# Patient Record
Sex: MALE | Race: WHITE | NOT HISPANIC OR LATINO | Employment: FULL TIME | URBAN - METROPOLITAN AREA
[De-identification: names, ages, dates, MRNs, and addresses within clinical notes are randomized per-mention and may not be internally consistent; named-entity substitution may affect disease eponyms.]

---

## 2019-04-02 NOTE — PROGRESS NOTES
"  SUBJECTIVE:   Raymond Amaya is a 44 year old male who presents to clinic today for the following health issues:    New patient     Patient was having wisdom tooth removed at dental office a few weeks ago and was told his BP was elevated 150's/?    Other concerns:  1. Growth- R axillary.  He states he started and noted about a year ago.  Periodically it does cause some mild odor.  2. Sharp intermittent pain in left side of rib over the last 2 weeks.  There are no classic exertional complaints or recent trauma.  3.  Patient also informs me that his father was recently diagnosed apparently with hemochromatosis and that his father has 2 brothers of similar diagnosis.  His father is undergoing regular phlebotomies    Problem list and histories reviewed & adjusted, as indicated.  Additional history: as documented    Patient Active Problem List   Diagnosis     Corn or callus     Past Surgical History:   Procedure Laterality Date     C MUSCLE-SKIN FLAP,ARM       HC RECONST POLYDACT DIGIT,SOFT TIS & BONE      removal 6th digit from each hand and foot       Social History     Tobacco Use     Smoking status: Never Smoker     Smokeless tobacco: Never Used   Substance Use Topics     Alcohol use: Yes     Comment: \"every now and then\"     Family History   Problem Relation Age of Onset     Liver Disease Father         hemochromatosis         No current outpatient medications on file.     No Known Allergies  BP Readings from Last 3 Encounters:   04/04/16 140/70   05/19/15 130/90   09/13/12 122/64    Wt Readings from Last 3 Encounters:   04/04/16 105.2 kg (232 lb)   05/19/15 108 kg (238 lb)   09/13/12 106 kg (233 lb 9.6 oz)                    Reviewed and updated as needed this visit by clinical staff  Meds       Reviewed and updated as needed this visit by Provider       ROS:  CONSTITUTIONAL: NEGATIVE for fever, chills, change in weight  ENT/MOUTH: NEGATIVE for ear, mouth and throat problems  RESP: NEGATIVE for significant cough " "or SOB  CV: NEGATIVE for chest pain, palpitations or peripheral edema  GI: NEGATIVE for nausea, abdominal pain, heartburn, or change in bowel habits  : NEGATIVE for frequency, dysuria, or hematuria  MUSCULOSKELETAL: NEGATIVE for significant arthralgias or myalgia  NEURO: NEGATIVE for weakness, dizziness or paresthesias  HEME: NEGATIVE for bleeding problems  PSYCHIATRIC: NEGATIVE for changes in mood or affect    OBJECTIVE:                                                    /84   Pulse 72   Temp 98  F (36.7  C) (Oral)   Resp 15   Ht 1.81 m (5' 11.25\")   Wt 107.5 kg (236 lb 14.4 oz)   SpO2 98%   BMI 32.81 kg/m    Body mass index is 32.81 kg/m .  GENERAL: alert and no distress  EYES: Eyes grossly normal to inspection, extraocular movements - intact, and PERRL  RESP: lungs clear to auscultation - no rales, no rhonchi, no wheezes  CV: regular rates and rhythm, normal S1 S2, no S3 or S4 and no murmur, no click or rub   MS: extremities- no gross deformities noted, no edema  There is no chest wall pain noted.  There is a superficial, flat slightly variegated elliptical elevated lesion noted roughly 3-1/2 x 6-1/2 cm to the right axilla  NEURO: strength and tone- normal, sensory exam- grossly normal, mentation- intact, speech- normal, reflexes- symmetric  PSYCH: Alert and oriented times 3; speech- coherent , normal rate and volume; able to articulate logical thoughts, able to abstract reason, no tangential thoughts, no hallucinations or delusions, affect- normal       ASSESSMENT/PLAN:                                                      (Z13.6) CARDIOVASCULAR SCREENING; LDL GOAL LESS THAN 130  (primary encounter diagnosis)  Comment: Labs ordered as fasting per routine.  Plan: Lipid panel reflex to direct LDL Fasting            (Z01.30) Blood pressure check  Comment: Blood pressure stable at present point suggest home monitoring and patient given parameters to follow-up  Plan: Hemoglobin, Comprehensive metabolic " panel            (Z83.49) FH: hemochromatosis  Comment: Recheck iron studies and hemoglobin  Plan: Ferritin, Iron and iron binding capacity            (R07.81) Rib pain  Comment: Appears superficial without any other focal changes.  Plan: Discussed options of care, discussed stress testing although low risk and atypical symptoms    (L98.9) Skin lesion  Comment: Advise dermatology appointment and patient will make an appointment  Plan: Question need for dermatological biopsy    See Patient Instructions    Ed Sanchez MD  [facility]

## 2019-04-03 ENCOUNTER — OFFICE VISIT (OUTPATIENT)
Dept: INTERNAL MEDICINE | Facility: CLINIC | Age: 44
End: 2019-04-03
Payer: COMMERCIAL

## 2019-04-03 VITALS
OXYGEN SATURATION: 98 % | DIASTOLIC BLOOD PRESSURE: 84 MMHG | BODY MASS INDEX: 33.17 KG/M2 | SYSTOLIC BLOOD PRESSURE: 134 MMHG | HEIGHT: 71 IN | HEART RATE: 72 BPM | TEMPERATURE: 98 F | WEIGHT: 236.9 LBS | RESPIRATION RATE: 15 BRPM

## 2019-04-03 DIAGNOSIS — Z13.6 CARDIOVASCULAR SCREENING; LDL GOAL LESS THAN 130: Primary | ICD-10-CM

## 2019-04-03 DIAGNOSIS — L98.9 SKIN LESION: ICD-10-CM

## 2019-04-03 DIAGNOSIS — Z01.30 BLOOD PRESSURE CHECK: ICD-10-CM

## 2019-04-03 DIAGNOSIS — Z83.49 FH: HEMOCHROMATOSIS: ICD-10-CM

## 2019-04-03 DIAGNOSIS — R07.81 RIB PAIN: ICD-10-CM

## 2019-04-03 LAB
ALBUMIN SERPL-MCNC: 4.2 G/DL (ref 3.4–5)
ALP SERPL-CCNC: 38 U/L (ref 40–150)
ALT SERPL W P-5'-P-CCNC: 37 U/L (ref 0–70)
ANION GAP SERPL CALCULATED.3IONS-SCNC: 7 MMOL/L (ref 3–14)
AST SERPL W P-5'-P-CCNC: 22 U/L (ref 0–45)
BILIRUB SERPL-MCNC: 1.1 MG/DL (ref 0.2–1.3)
BUN SERPL-MCNC: 15 MG/DL (ref 7–30)
CALCIUM SERPL-MCNC: 8.6 MG/DL (ref 8.5–10.1)
CHLORIDE SERPL-SCNC: 108 MMOL/L (ref 94–109)
CHOLEST SERPL-MCNC: 206 MG/DL
CO2 SERPL-SCNC: 26 MMOL/L (ref 20–32)
CREAT SERPL-MCNC: 0.94 MG/DL (ref 0.66–1.25)
FERRITIN SERPL-MCNC: 335 NG/ML (ref 26–388)
GFR SERPL CREATININE-BSD FRML MDRD: >90 ML/MIN/{1.73_M2}
GLUCOSE SERPL-MCNC: 99 MG/DL (ref 70–99)
HDLC SERPL-MCNC: 34 MG/DL
HGB BLD-MCNC: 16 G/DL (ref 13.3–17.7)
IRON SATN MFR SERPL: 37 % (ref 15–46)
IRON SERPL-MCNC: 102 UG/DL (ref 35–180)
LDLC SERPL CALC-MCNC: 137 MG/DL
NONHDLC SERPL-MCNC: 172 MG/DL
POTASSIUM SERPL-SCNC: 4.1 MMOL/L (ref 3.4–5.3)
PROT SERPL-MCNC: 7.8 G/DL (ref 6.8–8.8)
SODIUM SERPL-SCNC: 141 MMOL/L (ref 133–144)
TIBC SERPL-MCNC: 277 UG/DL (ref 240–430)
TRIGL SERPL-MCNC: 175 MG/DL

## 2019-04-03 PROCEDURE — 36415 COLL VENOUS BLD VENIPUNCTURE: CPT | Performed by: INTERNAL MEDICINE

## 2019-04-03 PROCEDURE — 82728 ASSAY OF FERRITIN: CPT | Performed by: INTERNAL MEDICINE

## 2019-04-03 PROCEDURE — 83550 IRON BINDING TEST: CPT | Performed by: INTERNAL MEDICINE

## 2019-04-03 PROCEDURE — 80053 COMPREHEN METABOLIC PANEL: CPT | Performed by: INTERNAL MEDICINE

## 2019-04-03 PROCEDURE — 80061 LIPID PANEL: CPT | Performed by: INTERNAL MEDICINE

## 2019-04-03 PROCEDURE — 85018 HEMOGLOBIN: CPT | Performed by: INTERNAL MEDICINE

## 2019-04-03 PROCEDURE — 83540 ASSAY OF IRON: CPT | Performed by: INTERNAL MEDICINE

## 2019-04-03 PROCEDURE — 99214 OFFICE O/P EST MOD 30 MIN: CPT | Performed by: INTERNAL MEDICINE

## 2019-04-03 ASSESSMENT — MIFFLIN-ST. JEOR: SCORE: 1995.66

## 2019-04-03 NOTE — LETTER
[facility]  [address] [address]  [phone]      4/3/2019       Raymond Amaya  [address] [facility] [id number]        Dear Raymond,    I am pleased to inform you that your routine blood work including your hemoglobin, sodium, potassium, calcium, kidney and liver function tests are all normal.    Your cholesterol is high and could be treated more aggressively with better diet, exercise and weight loss.  Please follow-up with me to discuss your further medication options/changes if you are interested.    Your iron studies returned normal.    Sincerely,      Ed Sanchez MD  Internal Medicine

## 2019-04-11 ENCOUNTER — OFFICE VISIT (OUTPATIENT)
Dept: URGENT CARE | Facility: URGENT CARE | Age: 44
End: 2019-04-11
Payer: COMMERCIAL

## 2019-04-11 VITALS
OXYGEN SATURATION: 98 % | TEMPERATURE: 97 F | SYSTOLIC BLOOD PRESSURE: 130 MMHG | HEART RATE: 63 BPM | DIASTOLIC BLOOD PRESSURE: 80 MMHG | RESPIRATION RATE: 17 BRPM | BODY MASS INDEX: 32.83 KG/M2 | WEIGHT: 237.06 LBS

## 2019-04-11 DIAGNOSIS — M25.512 ACUTE PAIN OF LEFT SHOULDER: Primary | ICD-10-CM

## 2019-04-11 PROCEDURE — 99214 OFFICE O/P EST MOD 30 MIN: CPT | Performed by: PHYSICIAN ASSISTANT

## 2019-04-11 RX ORDER — METHOCARBAMOL 750 MG/1
750 TABLET, FILM COATED ORAL 4 TIMES DAILY PRN
Qty: 40 TABLET | Refills: 0 | Status: SHIPPED | OUTPATIENT
Start: 2019-04-11

## 2019-04-11 RX ORDER — METHYLPREDNISOLONE 4 MG
TABLET, DOSE PACK ORAL
Qty: 21 TABLET | Refills: 0 | Status: SHIPPED | OUTPATIENT
Start: 2019-04-11 | End: 2019-05-01

## 2019-04-11 NOTE — PATIENT INSTRUCTIONS
(M25.512) Acute pain of left shoulder  (primary encounter diagnosis)  Comment:   Plan: methylPREDNISolone (MEDROL DOSEPAK) 4 MG tablet        therapy pack, methocarbamol (ROBAXIN) 750 MG         tablet, acetaminophen-codeine (TYLENOL #3)         300-30 MG tablet            With muscle spasm of trapezius.    Gentle stretches followed by ice to area over thin cloth for 20 minutes tonight.    Then moist heat to area for 20 minutes followed by stretches, then ice for 20 minutes three times a day thereafter until resolved.      Follow up with primary clinic    Consider follow up with Ortho should symptoms persist or worsen.

## 2019-04-11 NOTE — PROGRESS NOTES
"Patient presents with:  Urgent Care: left upper back/shoulder pain for one week with pain radiating down left arm for two days. Denies chest pain, no sob and no known injury.     SUBJECTIVE:  Chief Complaint   Patient presents with     Urgent Care     left upper back/shoulder pain for one week with pain radiating down left arm for two days. Denies chest pain, no sob and no known injury.      Raymond Amaya is a 44 year old male presents with a chief complaint of   1) left posterior shoulder and upper back pain onset over a week ago.  Onset was when he awoke one day last week.  Pain has progressively worsened.  He slept on a heating pad one night and pain was significantly worse the following morning.     Denies any injury.  Denies any chest pain or shortness of breath.,      Does complain of some tingling in his left lateral fingers.  Patient is right handed.      Past Medical History:   Diagnosis Date     Polydactyly      Webbed fingers of both hands      Patient Active Problem List   Diagnosis     Corn or callus     Social History     Tobacco Use     Smoking status: Never Smoker     Smokeless tobacco: Never Used   Substance Use Topics     Alcohol use: Yes     Comment: \"every now and then\"       ROS:  CONSTITUTIONAL:NEGATIVE for fever, chills, change in weight  INTEGUMENTARY/SKIN: NEGATIVE for worrisome rashes, moles or lesions  EYES: NEGATIVE for vision changes or irritation  ENT/MOUTH: NEGATIVE for ear, mouth and throat problems  RESP:NEGATIVE for significant cough or SOB  CV: NEGATIVE for chest pain, palpitations or peripheral edema  GI: NEGATIVE for nausea, abdominal pain, heartburn, or change in bowel habits  : male negative  MUSCULOSKELETAL: as per HPI  NEURO: as per HPI  Review of systems negative except as stated above.    EXAM:   /80   Pulse 63   Temp 97  F (36.1  C) (Oral)   Resp 17   Wt 107.5 kg (237 lb 1 oz)   SpO2 98%   BMI 32.83 kg/m    Gen: healthy, alert, active and healthy,alert,no " distress  Extremity: left shoulder: mildly tender over anterior joint.  No bony tenderness or crepitus.    Palpable muscle spasm of left trapezius.      No vertebral tenderness.     There is not compromise to the distal circulation.  Pulses are +2 and CRT is brisk  SKIN: no suspicious lesions or rashes  NEURO: Normal strength and tone, sensory exam grossly normal, mentation intact and speech normal    X-RAY was not done.    (M25.512) Acute pain of left shoulder  (primary encounter diagnosis)  Comment:   Plan: methylPREDNISolone (MEDROL DOSEPAK) 4 MG tablet        therapy pack, methocarbamol (ROBAXIN) 750 MG         tablet, acetaminophen-codeine (TYLENOL #3)         300-30 MG tablet            With muscle spasm of trapezius.    Gentle stretches followed by ice to area over thin cloth for 20 minutes tonight.    Then moist heat to area for 20 minutes followed by stretches, then ice for 20 minutes three times a day thereafter until resolved.      Follow up with primary clinic    Consider follow up with Ortho should symptoms persist or worsen.      Use codeine with caution, causes drowsiness.      Patient expresses understanding and agreement with the assessment and plan as above.

## 2019-05-01 ENCOUNTER — OFFICE VISIT (OUTPATIENT)
Dept: INTERNAL MEDICINE | Facility: CLINIC | Age: 44
End: 2019-05-01
Payer: COMMERCIAL

## 2019-05-01 VITALS
RESPIRATION RATE: 13 BRPM | TEMPERATURE: 98.1 F | SYSTOLIC BLOOD PRESSURE: 132 MMHG | BODY MASS INDEX: 32.77 KG/M2 | HEART RATE: 65 BPM | OXYGEN SATURATION: 98 % | WEIGHT: 236.6 LBS | DIASTOLIC BLOOD PRESSURE: 80 MMHG

## 2019-05-01 DIAGNOSIS — M25.512 LEFT SHOULDER PAIN, UNSPECIFIED CHRONICITY: Primary | ICD-10-CM

## 2019-05-01 PROCEDURE — 99214 OFFICE O/P EST MOD 30 MIN: CPT | Performed by: INTERNAL MEDICINE

## 2019-05-01 NOTE — PROGRESS NOTES
"  SUBJECTIVE:   Raymond Amaya is a 44 year old male who presents to clinic today for the following   health issues:    ED/UC Followup:    Facility:  [facility]  Date of visit: 4/11/19  Reason for visit: Acute pain of left shoulder   Current Status: Worsening, patient now experiencing tenderness in arm, and numbness that radiates into finger and thumb     Patient states he was seen in urgent care after awakening one morning due to left shoulder pain.  There is no known history of trauma.  He reports that his range of motion as well as his functional status of his arm is pretty good with the exception of the fact that when his arm is hanging down to his side he does get more pain and some slight tingling in his left arm.  He was given a trial of steroids as well as muscle relaxants at his urgent care visit but did notes that this was not helpful.  He does not have any neck pain and is not had any issue with his right shoulder.  He also states he has had no prior history with this similar shoulder.    Additional history: as documented    Reviewed  and updated as needed this visit by clinical staff       Reviewed and updated as needed this visit by Provider         Patient Active Problem List   Diagnosis     Corn or callus     Past Surgical History:   Procedure Laterality Date     C MUSCLE-SKIN FLAP,ARM       HC RECONST POLYDACT DIGIT,SOFT TIS & BONE      removal 6th digit from each hand and foot       Social History     Tobacco Use     Smoking status: Never Smoker     Smokeless tobacco: Never Used   Substance Use Topics     Alcohol use: Yes     Comment: \"every now and then\"     Family History   Problem Relation Age of Onset     Liver Disease Father         hemochromatosis         Current Outpatient Medications   Medication Sig Dispense Refill     methocarbamol (ROBAXIN) 750 MG tablet Take 1 tablet (750 mg) by mouth 4 times daily as needed 40 tablet 0     No Known Allergies  BP Readings from Last 3 Encounters:   05/01/19 " 132/80   04/11/19 130/80   04/03/19 134/84    Wt Readings from Last 3 Encounters:   05/01/19 107.3 kg (236 lb 9.6 oz)   04/11/19 107.5 kg (237 lb 1 oz)   04/03/19 107.5 kg (236 lb 14.4 oz)           ROS:  CONSTITUTIONAL: NEGATIVE for fever, chills, change in weight  INTEGUMENTARY/SKIN: NEGATIVE for worrisome rashes, moles or lesions  ENT/MOUTH: NEGATIVE for ear, mouth and throat problems  RESP: NEGATIVE for significant cough or SOB  CV: NEGATIVE for chest pain, palpitations or peripheral edema  GI: NEGATIVE for nausea, abdominal pain, heartburn, or change in bowel habits  : NEGATIVE for frequency, dysuria, or hematuria  NEURO: NEGATIVE for weakness, dizziness or paresthesias  HEME: NEGATIVE for bleeding problems  PSYCHIATRIC: NEGATIVE for changes in mood or affect    OBJECTIVE:                                                    /80   Pulse 65   Temp 98.1  F (36.7  C) (Oral)   Resp 13   Wt 107.3 kg (236 lb 9.6 oz)   SpO2 98%   BMI 32.77 kg/m    Body mass index is 32.77 kg/m .  GENERAL: alert and no distress  EYES: Eyes grossly normal to inspection, extraocular movements - intact, and PERRL  HENT: ear canals- normal; TMs- normal; Nose- normal; Mouth- no ulcers, no lesions  NECK: no tenderness, no adenopathy, no asymmetry, no masses, no stiffness; thyroid- normal to palpation  RESP: lungs clear to auscultation - no rales, no rhonchi, no wheezes  CV: regular rates and rhythm, normal S1 S2, no S3 or S4 and no murmur, no click or rub -  ABDOMEN: soft, no tenderness, no  hepatosplenomegaly, no masses, normal bowel sounds  MS: extremities- no gross deformities noted, the left shoulder demonstrates preserved range of motion with normal internal and external rotation as well as abduction and abduction.  NEURO: strength and tone- normal, sensory exam- grossly normal, mentation- intact, speech- normal, reflexes- symmetric  PSYCH: Alert and oriented times 3; speech- coherent , normal rate and volume; able to  articulate logical thoughts, able to abstract reason, no tangential thoughts, no hallucinations or delusions, affect- normal       ASSESSMENT/PLAN:                                                      (M25.512) Left shoulder pain, unspecified chronicity  (primary encounter diagnosis)  Comment: Suggest conservative approach with initiation of physical therapy and observation of clinical response thereafter.  Plan: [facility] REFERRAL        If no improvement or worsening of functional status consider imaging and referral.    See Patient Instructions    Ed Sanchez MD  [facility]

## 2019-06-07 ENCOUNTER — OFFICE VISIT (OUTPATIENT)
Dept: URGENT CARE | Facility: URGENT CARE | Age: 44
End: 2019-06-07
Payer: COMMERCIAL

## 2019-06-07 VITALS
TEMPERATURE: 98.9 F | HEART RATE: 63 BPM | DIASTOLIC BLOOD PRESSURE: 78 MMHG | RESPIRATION RATE: 16 BRPM | SYSTOLIC BLOOD PRESSURE: 118 MMHG | OXYGEN SATURATION: 98 %

## 2019-06-07 DIAGNOSIS — H83.03 LABYRINTHITIS OF BOTH EARS: Primary | ICD-10-CM

## 2019-06-07 PROCEDURE — 99214 OFFICE O/P EST MOD 30 MIN: CPT | Performed by: FAMILY MEDICINE

## 2019-06-07 RX ORDER — MECLIZINE HYDROCHLORIDE 25 MG/1
25 TABLET ORAL 3 TIMES DAILY PRN
Qty: 30 TABLET | Refills: 0 | Status: SHIPPED | OUTPATIENT
Start: 2019-06-07 | End: 2019-06-17

## 2019-06-07 NOTE — PATIENT INSTRUCTIONS
Patient Education     Labyrinthitis    Labyrinthitis is the inflammation of part of the inner ear called the labyrinth. It usually affects only one ear. A nerve in the head called the 8th cranial nerve may also be inflamed. Labyrinthitis causes a sense of spinning and hearing loss. In most people these go away over time.  Understanding the inner ear  The inner ear has a system of fluid-filled tubes and sacs. This system is called the labyrinth. Inside the inner ear, the cochlea senses sound. The vestibular organs take in sense motion and changes in space. These create your sense of balance. The 8th cranial nerve (vestibulocochlear nerve) sends all of this information from the inner ear to the brain.  When 1 of the nerves or the labyrinth is infected, it can become inflamed. This can cause it to not work normally. It may cause hearing loss in 1 ear. The brain now has to make sense of the information that doesn't match between the normal nerve and the infected one. This causes a feeling that the world is spinning around you (vertigo).  What causes labyrinthitis?  A viral infection may cause the condition. The virus may have spread throughout your body. Or it may only affect the labyrinth and 8th cranial nerve. Usually only 1 nerve is affected. Viruses that can cause labyrinthitis include:    Herpes viruses    Influenza    Measles    Mumps    Rubella    Polio    Hepatitis    Dexter-Eduardo    Varicella  Chronic bacterial infections of the middle ear can spread to the inner ear and cause labyrinthitis. In rare cases bacterial meningitis or head trauma may cause labyrinthitis. In other cases the cause of labyrinthitis is not known.  Symptoms of labyrinthitis  Symptoms of labyrinthitis of may include:    A feeling of spinning (vertigo)    Dizziness    Lack of balance when walking    Nausea and vomiting    Trouble concentrating    Back-and-forth eye movements that you can't control (nystagmus)    Hearing loss    Ringing in  the ears  Symptoms may range from mild to severe. Severe symptoms such as vertigo can cause problems with standing and walking. Symptoms may come on very quickly and start during routine daily activities. Or you may start to have symptoms when you wake up in the morning. In many people, these symptoms go away over several weeks. Or they can last longer.  Diagnosing labyrinthitis  Your healthcare provider will ask about your past health. You may also have a physical exam. This may include hearing and balance tests. It will also include an exam of your nervous system. There are no tests for labyrinthitis. But your healthcare provider may have you take an imaging test. Many health conditions can cause dizziness and vertigo. Your healthcare provider will need to make sure you don't have another condition that causes these symptoms, such as stroke.  You may have tests such as:    MRI, to check for a stroke    Electrocardiogram (ECG), to check for cardiovascular causes    Electronystagmography (ENG) or videonystagmography (VNG). Either of these records your eye movement to find where the problem is in your vestibular system. These tests can find the cause of a balance disorder.  Treatment for labyrinthitis  Treatment depends on your overall health and symptoms. Treatment for labyrinthitis may include:    Corticosteroids to help reduce inflammation of the nerve    Antiviral medicines    Antibiotics if you have signs of a bacterial infection    Medicines to take for a short time that control nausea and dizziness, such as diphenhydramine or lorazepam  If your symptoms go away in a few weeks, you likely won't need other treatment. If you have symptoms that don't go away, you may need to do certain exercises. These are known as vestibular rehabilitation exercises. They are a form of physical therapy. These exercises may help your brain learn to adjust to the vestibular imbalance. A physical therapist will teach you the  exercises. Then you can do them at home.  Possible complications of labyrinthitis  In most cases labyrinthitis does not cause any complications. In rare cases it may permanently damage the 8th cranial nerve. This can cause lasting problems with balance. It can also cause partial or total loss of hearing. You may need to use a hearing aid. Getting treated right away can help reduce your risk for these problems.     When to call the healthcare provider  Call your healthcare provider right away if you have any of these:    Symptoms that get worse, or don't get better with treatment    New symptoms    Date Last Reviewed: 11/1/2017 2000-2018 [employer] [address] [place] [address], [place] [address]. All rights reserved. This information is not intended as a substitute for professional medical care. Always follow your healthcare professional's instructions.

## 2019-06-07 NOTE — PROGRESS NOTES
"SUBJECTIVE:   Raymond Amaya is a 44 year old male presenting with a chief complaint of acute onset of dizziness noticing it more when he is turning his head or bending his head down or standing from sitting position.  Also has some associated nausea with it some tinnitus symptoms 2.  Denies any URI symptoms.  Has no focal neurological symptoms.  He is an established patient of [facility].  Onset of symptoms was 1 day(s) ago.  Course of illness is same.    Severity moderate  Current and Associated symptoms: nausea and dizziness  Treatment measures tried include None tried.  Predisposing factors include None.    Past Medical History:   Diagnosis Date     Polydactyly      Webbed fingers of both hands      Current Outpatient Medications   Medication Sig Dispense Refill     meclizine (ANTIVERT) 25 MG tablet Take 1 tablet (25 mg) by mouth 3 times daily as needed for dizziness 30 tablet 0     methocarbamol (ROBAXIN) 750 MG tablet Take 1 tablet (750 mg) by mouth 4 times daily as needed (Patient not taking: Reported on 6/7/2019) 40 tablet 0     Social History     Tobacco Use     Smoking status: Never Smoker     Smokeless tobacco: Never Used   Substance Use Topics     Alcohol use: Yes     Comment: \"every now and then\"     Family History   Problem Relation Age of Onset     Liver Disease Father         hemochromatosis         ROS:    10 point ROS of systems including Constitutional, Eyes, Respiratory, Cardiovascular, Gastroenterology, Genitourinary, Integumentary, Muscularskeletal, Psychiatric were all negative except for pertinent positives noted in my HPI         OBJECTIVE:  /78   Pulse 63   Temp 98.9  F (37.2  C) (Oral)   Resp 16   SpO2 98%   GENERAL APPEARANCE: healthy, alert and no distress  EYES: EOMI,  PERRL, conjunctiva clear  HENT: ear canals and TM's normal.  Nose and mouth without ulcers, erythema or lesions  NECK: supple, nontender, no lymphadenopathy  RESP: lungs clear to auscultation - no rales, rhonchi or " wheezes  CV: regular rates and rhythm, normal S1 S2, no murmur noted  ABDOMEN:  soft, nontender, no HSM or masses and bowel sounds normal  NEURO: Normal strength and tone, sensory exam grossly normal,  normal speech and mentation   nose test intact rapid alternating movements within normal limits  SKIN: no suspicious lesions or rashes  Physical Exam          Medical Decision Making:    Differential Diagnosis:  Vertigo/labyrinthitis/viral infection/ear infection      ASSESSMENT:  Raymond was seen today for urgent care.    Diagnoses and all orders for this visit:    Labyrinthitis of both ears  -     meclizine (ANTIVERT) 25 MG tablet; Take 1 tablet (25 mg) by mouth 3 times daily as needed for dizziness          PLAN:  Reviewed with patient about the condition advised to start doing the medication that should help with the dizziness  Discussed that  it can also cause slight sleepiness   if symptoms continue to worsen should follow-up for further evaluation and treatment also did review with patient that this can last for 7 to 10 days   he should not be dealing with heavy machinery while the symptoms are there  Follow up if  symptoms fail to improve or worsens   Pt understood and agreed with plan     Nadine Murdock MD       See orders in Epic

## 2019-12-30 ENCOUNTER — OFFICE VISIT (OUTPATIENT)
Dept: DERMATOLOGY | Facility: CLINIC | Age: 44
End: 2019-12-30
Payer: COMMERCIAL

## 2019-12-30 VITALS — OXYGEN SATURATION: 96 % | SYSTOLIC BLOOD PRESSURE: 143 MMHG | HEART RATE: 73 BPM | DIASTOLIC BLOOD PRESSURE: 82 MMHG

## 2019-12-30 DIAGNOSIS — R21 RASH AND OTHER NONSPECIFIC SKIN ERUPTION: Primary | ICD-10-CM

## 2019-12-30 DIAGNOSIS — Z80.8 FAMILY HISTORY OF SKIN CANCER: ICD-10-CM

## 2019-12-30 DIAGNOSIS — L81.4 LENTIGINES: ICD-10-CM

## 2019-12-30 DIAGNOSIS — D18.01 CHERRY ANGIOMA: ICD-10-CM

## 2019-12-30 PROCEDURE — 99214 OFFICE O/P EST MOD 30 MIN: CPT | Mod: 25 | Performed by: PHYSICIAN ASSISTANT

## 2019-12-30 PROCEDURE — 11102 TANGNTL BX SKIN SINGLE LES: CPT | Performed by: PHYSICIAN ASSISTANT

## 2019-12-30 PROCEDURE — 88305 TISSUE EXAM BY PATHOLOGIST: CPT | Mod: TC | Performed by: PHYSICIAN ASSISTANT

## 2019-12-30 NOTE — PATIENT INSTRUCTIONS
Wound Care Instructions     FOR SUPERFICIAL WOUNDS     [facility] [phone]    [facility] [phone]          AFTER 24 HOURS YOU SHOULD REMOVE THE BANDAGE AND BEGIN DAILY DRESSING CHANGES AS FOLLOWS:     1) Remove Dressing.     2) Clean and dry the area with tap water using a Q-tip or sterile gauze pad.     3) Apply Vaseline, Aquaphor, Polysporin ointment or Bacitracin ointment over entire wound.  Do NOT use Neosporin ointment.     4) Cover the wound with a band-aid, or a sterile non-stick gauze pad and micropore paper tape      REPEAT THESE INSTRUCTIONS AT LEAST ONCE A DAY UNTIL THE WOUND HAS COMPLETELY HEALED.    It is an old wives tale that a wound heals better when it is exposed to air and allowed to dry out. The wound will heal faster with a better cosmetic result if it is kept moist with ointment and covered with a bandage.    **Do not let the wound dry out.**      Supplies Needed:      *Cotton tipped applicators (Q-tips)    *Polysporin Ointment or Bacitracin Ointment (NOT NEOSPORIN)    *Band-aids or non-stick gauze pads and micropore paper tape.      PATIENT INFORMATION:    During the healing process you will notice a number of changes. All wounds develop a small halo of redness surrounding the wound.  This means healing is occurring. Severe itching with extensive redness usually indicates sensitivity to the ointment or bandage tape used to dress the wound.  You should call our office if this develops.      Swelling  and/or discoloration around your surgical site is common, particularly when performed around the eye.    All wounds normally drain.  The larger the wound the more drainage there will be.  After 7-10 days, you will notice the wound beginning to shrink and new skin will begin to grow.  The wound is healed when you can see skin has formed over the entire area.  A healed wound has a healthy, shiny look to the surface and is red to dark pink in color to  normalize.  Wounds may take approximately 4-6 weeks to heal.  Larger wounds may take 6-8 weeks.  After the wound is healed you may discontinue dressing changes.    You may experience a sensation of tightness as your wound heals. This is normal and will gradually subside.    Your healed wound may be sensitive to temperature changes. This sensitivity improves with time, but if you re having a lot of discomfort, try to avoid temperature extremes.    Patients frequently experience itching after their wound appears to have healed because of the continue healing under the skin.  Plain Vaseline will help relieve the itching.        POSSIBLE COMPLICATIONS    BLEEDIN. Leave the bandage in place.  2. Use tightly rolled up gauze or a cloth to apply direct pressure over the bandage for 30  minutes.  3. Reapply pressure for an additional 30 minutes if necessary  4. Use additional gauze and tape to maintain pressure once the bleeding has stopped.        Proper skin care from [facility]:    -Eliminate harsh soaps as they strip the natural oils from the skin, often resulting in dry itchy skin ( i.e. Dial, Zest, Kaylynn Spring)  -Use mild soaps such as Cetaphil or Dove Sensitive Skin in the shower. You do not need to use soap on arms, legs, and trunk every time you shower unless visibly soiled.   -Avoid hot or cold showers.  -After showering, lightly dry off and apply moisturizing within 2-3 minutes. This will help trap moisture in the skin.   -Aggressive use of a moisturizer at least 1-2 times a day to the entire body (including -Vanicream, Cetaphil, Aquaphor or Cerave) and moisturize hands after every washing.  -We recommend using moisturizers that come in a tub that needs to be scooped out, not a pump. This has more of an oil base. It will hold moisture in your skin much better than a water base moisturizer. The above recommended are non-pore clogging.      Wear a sunscreen with at least SPF 30 on your face, ears,  neck and V of the chest daily. Wear sunscreen on other areas of the body if those areas are exposed to the sun throughout the day. Sunscreens can contain physical and/or chemical blockers. Physical blockers are less likely to clog pores, these include zinc oxide and titanium dioxide. Reapply every two hour and after swimming. Sunscreen examples include Neutrogena, CeraVe, Blue Lizard, Elta MD and many others.    UV radiation  UVA radiation remains constant throughout the day and throughout the year. It is a longer wavelength than UVB and therefore penetrates deeper into the skin leading to immediate and delayed tanning, photoaging, and skin cancer. 70-80% of UVA and UVB radiation occurs between the hours of 10am-2pm.  UVB radiation  UVB radiation causes the most harmful effects and is more significant during the summer months. However, snow and ice can reflect UVB radiation leading to skin damage during the winter months as well. UVB radiation is responsible for tanning, burning, inflammation, delayed erythema (pinkness), pigmentation (brown spots), and skin cancer.

## 2019-12-30 NOTE — PROGRESS NOTES
"HPI:  Raymond Amaya is a 44 year old male patient here today for growth on right underarm .  Patient states this has been present for 1.5 years.  Patient reports the following symptoms: cracks, bleeds, painful. .  Patient reports the following previous treatments: otc deodorant helps with pain.  Patient reports the following modifying factors: none.  Associated symptoms: none.  Patient has no other skin complaints today.  Remainder of the HPI, Meds, PMH, Allergies, FH, and SH was reviewed in chart.    Pertinent hx: mother had a skin cancer. Unknown type.   Past Medical History:   Diagnosis Date     Polydactyly      Webbed fingers of both hands        Past Surgical History:   Procedure Laterality Date     C MUSCLE-SKIN FLAP,ARM       HC RECONST POLYDACT DIGIT,SOFT TIS & BONE      removal 6th digit from each hand and foot        Family History   Problem Relation Age of Onset     Liver Disease Father         hemochromatosis     Skin Cancer Mother        Social History     Socioeconomic History     Marital status:      Spouse name: Not on file     Number of children: Not on file     Years of education: Not on file     Highest education level: Not on file   Occupational History     Occupation:      Employer: [employer]   Social Needs     Financial resource strain: Not on file     Food insecurity:     Worry: Not on file     Inability: Not on file     Transportation needs:     Medical: Not on file     Non-medical: Not on file   Tobacco Use     Smoking status: Never Smoker     Smokeless tobacco: Never Used   Substance and Sexual Activity     Alcohol use: Yes     Comment: \"every now and then\"     Drug use: No     Sexual activity: Yes     Partners: Female   Lifestyle     Physical activity:     Days per week: Not on file     Minutes per session: Not on file     Stress: Not on file   Relationships     Social connections:     Talks on phone: Not on file     Gets together: Not on file     Attends " Mormon service: Not on file     Active member of club or organization: Not on file     Attends meetings of clubs or organizations: Not on file     Relationship status: Not on file     Intimate partner violence:     Fear of current or ex partner: Not on file     Emotionally abused: Not on file     Physically abused: Not on file     Forced sexual activity: Not on file   Other Topics Concern     Parent/sibling w/ CABG, MI or angioplasty before 65F 55M? Not Asked   Social History Narrative     Not on file       Outpatient Encounter Medications as of 12/30/2019   Medication Sig Dispense Refill     methocarbamol (ROBAXIN) 750 MG tablet Take 1 tablet (750 mg) by mouth 4 times daily as needed (Patient not taking: Reported on 6/7/2019) 40 tablet 0     No facility-administered encounter medications on file as of 12/30/2019.        Review Of Systems:  Skin: rash on right underarm  Eyes: negative  Ears/Nose/Throat: negative  Respiratory: No shortness of breath, dyspnea on exertion, cough, or hemoptysis  Cardiovascular: negative  Gastrointestinal: negative  Genitourinary: negative  Musculoskeletal: negative  Neurologic: negative  Psychiatric: negative  Hematologic/Lymphatic/Immunologic: negative  Endocrine: negative      Objective:     BP (!) 143/82   Pulse 73   SpO2 96%   Eyes: Conjunctivae/lids: Normal   ENT: Lips:  Normal  MSK: Normal  Cardiovascular: Peripheral edema none  Pulm: Breathing Normal  Neuro/Psych: Orientation: A/O x 3 Normal; Mood/Affect: Normal, NAD, WDWN  Pt accompanied by: self  Following areas examined: face, neck, right and left axilla, right arm, forearm, and hands  Willard skin type:i   Findings:  Pink fissured plaque with raised lobular borders  Red smooth well-defined macules on right flank  Buchanan WD smooth macules on face and RU    Assessment and Plan:  1) Rash and nonspecific skin eruption 7.0cm  Biopsy on right axilla 0.5cm b  Acanthoma vs isk vs surya surya  TANGENTIAL BIOPSY:  After consent,  anesthesia with LEC and prep, tangential biopsy performed.  No complications and routine wound care.  May grow back and will get a scar. Based on lesion type may need to completely remove lesion. Patient will be notified in 7-10 days of results. Wound care directions given.  Disc barrier cream such as vaseline, aquaphor or desitin  Disc rx topicals. Pt defers, would like to wait until biopsy results are in.    2) Lentigines and cherry angiomas    Treatment of these lesions would be purely cosmetic and not medically neccessary.  Lesion may recur and/or may not completely resolve. May need additional treatment.  Different removal options including excision, cryotherapy, cautery and /or laser.      3) family history of skin cancer  Recommend yearly FBE.  Wear a sunscreen with at least SPF 30 on your face, ears, neck and V of the chest daily. Wear sunscreen on other areas of the body if those areas are exposed to the sun throughout the day. Sunscreens can contain physical and/or chemical blockers. Physical blockers are less likely to clog pores, these include zinc oxide and titanium dioxide. Reapply every two hour and after swimming. Sunscreen examples include Neutrogena, CeraVe, Blue Lizard, Elta MD and many others.        Raymond to follow up with Primary Care provider regarding elevated blood pressure.      Follow up in based on biopsy results.

## 2020-01-02 ENCOUNTER — TELEPHONE (OUTPATIENT)
Dept: DERMATOLOGY | Facility: CLINIC | Age: 45
End: 2020-01-02

## 2020-01-02 LAB — COPATH REPORT: NORMAL

## 2020-01-02 NOTE — LETTER
[facility]  [address]  [address]  [phone]    1/3/2020       Raymond Amaya  [address] [facility] [address]      Dear Raymond:    You are scheduled for Mohs Surgery on: 1/30/20 @7:00am.    Please check in at [facility], [address].     You don't need to arrive more than 5-10 minutes prior to your appointment time.     Be sure to eat a good breakfast and bathe and wash your hair prior to surgery.     If you are taking any anti-coagulants that are prescribed by your Doctor (such as Coumadin/Warfarin, Plavix, Aspirin, Ibuprofen), please continue taking them.     However, if you are taking anti-coagulants over the counter without a Doctor's order for a medical condition, please discontinue them 10 days prior to surgery.     Please wear loose comfortable clothing as it could possibly be 4-6 hours until your surgery is completed depending upon how many layers of tissue need to be removed.      Thank you,    SUSANA Wang MD

## 2020-01-29 NOTE — PROGRESS NOTES
Surgical Office Location:  [facility]  [address] [place] [id number]

## 2020-01-30 ENCOUNTER — OFFICE VISIT (OUTPATIENT)
Dept: DERMATOLOGY | Facility: CLINIC | Age: 45
End: 2020-01-30
Payer: COMMERCIAL

## 2020-01-30 VITALS — SYSTOLIC BLOOD PRESSURE: 142 MMHG | OXYGEN SATURATION: 97 % | HEART RATE: 84 BPM | DIASTOLIC BLOOD PRESSURE: 84 MMHG

## 2020-01-30 DIAGNOSIS — C44.519 BASAL CELL CARCINOMA (BCC) OF AXILLA: Primary | ICD-10-CM

## 2020-01-30 PROCEDURE — 17311 MOHS 1 STAGE H/N/HF/G: CPT | Performed by: DERMATOLOGY

## 2020-01-30 PROCEDURE — 13133 CMPLX RPR F/C/C/M/N/AX/G/H/F: CPT | Performed by: DERMATOLOGY

## 2020-01-30 PROCEDURE — 17315 MOHS SURG ADDL BLOCK: CPT | Performed by: DERMATOLOGY

## 2020-01-30 PROCEDURE — 13132 CMPLX RPR F/C/C/M/N/AX/G/H/F: CPT | Mod: 51 | Performed by: DERMATOLOGY

## 2020-01-30 RX ORDER — DOXYCYCLINE 100 MG/1
100 CAPSULE ORAL 2 TIMES DAILY
Qty: 14 CAPSULE | Refills: 0 | Status: SHIPPED | OUTPATIENT
Start: 2020-01-30

## 2020-01-30 NOTE — PROGRESS NOTES
"Raymond Amaya is a 44 year old year old male patient here today for evaluation and managment of basal cell carcinoma on right axilla.  Patient has no other skin complaints today.  Remainder of the HPI, Meds, PMH, Allergies, FH, and SH was reviewed in chart.      Past Medical History:   Diagnosis Date     Basal cell carcinoma      Polydactyly      Webbed fingers of both hands        Past Surgical History:   Procedure Laterality Date     C MUSCLE-SKIN FLAP,ARM       HC RECONST POLYDACT DIGIT,SOFT TIS & BONE      removal 6th digit from each hand and foot        Family History   Problem Relation Age of Onset     Liver Disease Father         hemochromatosis     Skin Cancer Mother        Social History     Socioeconomic History     Marital status:      Spouse name: Not on file     Number of children: Not on file     Years of education: Not on file     Highest education level: Not on file   Occupational History     Occupation:      Employer: [employer]   Social Needs     Financial resource strain: Not on file     Food insecurity:     Worry: Not on file     Inability: Not on file     Transportation needs:     Medical: Not on file     Non-medical: Not on file   Tobacco Use     Smoking status: Never Smoker     Smokeless tobacco: Never Used   Substance and Sexual Activity     Alcohol use: Yes     Comment: \"every now and then\"     Drug use: No     Sexual activity: Yes     Partners: Female   Lifestyle     Physical activity:     Days per week: Not on file     Minutes per session: Not on file     Stress: Not on file   Relationships     Social connections:     Talks on phone: Not on file     Gets together: Not on file     Attends Jainism service: Not on file     Active member of club or organization: Not on file     Attends meetings of clubs or organizations: Not on file     Relationship status: Not on file     Intimate partner violence:     Fear of current or ex partner: Not on file     Emotionally " abused: Not on file     Physically abused: Not on file     Forced sexual activity: Not on file   Other Topics Concern     Parent/sibling w/ CABG, MI or angioplasty before 65F 55M? Not Asked   Social History Narrative     Not on file       Outpatient Encounter Medications as of 1/30/2020   Medication Sig Dispense Refill     methocarbamol (ROBAXIN) 750 MG tablet Take 1 tablet (750 mg) by mouth 4 times daily as needed (Patient not taking: Reported on 6/7/2019) 40 tablet 0     No facility-administered encounter medications on file as of 1/30/2020.              Review Of Systems  Skin: As above  Eyes: negative  Ears/Nose/Throat: negative  Respiratory: No shortness of breath, dyspnea on exertion, cough, or hemoptysis  Cardiovascular: negative  Gastrointestinal: negative  Genitourinary: negative  Musculoskeletal: negative  Neurologic: negative  Psychiatric: negative  Hematologic/Lymphatic/Immunologic: negative  Endocrine: negative      O:   NAD, WDWN, Alert & Oriented, Mood & Affect wnl, Vitals stable   Here today alone   BP (!) 142/84   Pulse 84   SpO2 97%    General appearance normal   Vitals stable   Alert, oriented and in no acute distress     R axilla 7.5x3.3 verrucous plaque      Eyes: Conjunctivae/lids:Normal     ENT: Lips, buccal mucosa, tongue: normal    MSK:Normal    Cardiovascular: peripheral edema none    Pulm: Breathing Normal    Lymph Nodes: No Head and Neck Lymphadenopathy     Neuro/Psych: Orientation:Alert and Orientedx3 ; Mood/Affect:normal       A/P:  1. R axilla basal cell carcinoma   MOHS:   Size    After PGACAC discussed with patient, decision for Mohs surgery was made. Indication for Mohs was Size. Patient confirmed the site with Dr. Wang.  After anesthesia with LEC, the tumor was excised using standard Mohs technique in 1 stages(s).  CLEAR MARGINS OBTAINED and Final defect size was 8.3 x 4 cm.     First stage 8 tissue blocks       REPAIR COMPLEX: Because of the tightness of the surrounding skin  and Because of the size and full thickness nature of the defect, a complex closure was planned. After LEC anesthesia and prep, Burow's triangles were excised in the relaxed skin tension lines. The wound edges were widely undermined by dissection in the subcutaneous plane until adequate tissue mobility was obtained. Hemostasis was obtained. The wound edges were closed in a layered fashion using Vicryl and Fast Absorbing Plain Gut sutures. Postoperative length was 11 cm.   EBL minimal; complications none; wound care routine.  The patient was discharged in good condition and will return in one week for wound evaluation.  BENIGN LESIONS DISCUSSED WITH PATIENT:  I discussed the specifics of tumor, prognosis, and genetics of benign lesions.  I explained that treatment of these lesions would be purely cosmetic and not medically neccessary.  I discussed with patient different removal options including excision, cautery and /or laser.      Nature and genetics of benign skin lesions dicussed with patient.  Signs and Symptoms of skin cancer discussed with patient.  ABCDEs of melanoma reviewed with patient.  Patient encouraged to perform monthly skin exams.  UV precautions reviewed with patient.  Patient to follow up with Primary Care provider regarding elevated blood pressure.  Skin care regimen reviewed with patient: Eliminate harsh soaps, i.e. Dial, zest, irsih spring; Mild soaps such as Cetaphil or Dove sensitive skin, avoid hot or cold showers, aggressive use of emollients including vanicream, cetaphil or cerave discussed with patient.    Risks of non-melanoma skin cancer discussed with patient   Return to clinic 6 months

## 2020-01-30 NOTE — PATIENT INSTRUCTIONS
Sutured Wound Care     [facility]: [phone]    [facility]: [phone]          ? No strenuous activity for 48 hours. Resume moderate activity in 48 hours. No heavy exercising until you are seen for follow up in one week.     ? Take Tylenol as needed for discomfort.                         ? Do not drink alcoholic beverages for 48 hours.     ? Keep the pressure bandage in place for 24 hours. If the bandage becomes blood tinged or loose, reinforce it with gauze and tape.        (Refer to the reverse side of this page for management of bleeding).    ? Remove pressure bandage in 24 hours     ? Leave the flat bandage in place until your follow up appointment.    ? Keep the bandage dry. Wash around it carefully.    ? If the tape becomes soiled or starts to come off, reinforce it with additional paper tape.    ? Do not smoke for 3 weeks; smoking is detrimental to wound healing.    ? It is normal to have swelling and bruising around the surgical site. The bruising will fade in approximately 10-14 days. Elevate the area to reduce swelling.    ? Numbness, itchiness and sensitivity to temperature changes can occur after surgery and may take up to 18 months to normalize.      POSSIBLE COMPLICATIONS    BLEEDIN. Leave the bandage in place.  2. Use tightly rolled up gauze or a cloth to apply direct pressure over the bandage for 20   minutes.  3. Reapply pressure for an additional 20 minutes if necessary  4. Call the office or go to the nearest emergency room if pressure fails to stop the bleeding.  5. Use additional gauze and tape to maintain pressure once the bleeding has stopped.        PAIN:    1. Post operative pain should slowly get better, never worse.  2. A severe increase in pain may indicate a problem. Call the office if this occurs.    In case of emergency phone:Dr Wang [phone]

## 2020-01-30 NOTE — LETTER
"    1/30/2020         RE: Raymond Amaya  [address] [facility] [address]        Dear Colleague,    Thank you for referring your patient, Raymond Amaya, to the [facility]. Please see a copy of my visit note below.    Surgical Office Location:  [facility]  [address] [address]      Raymond Amaya is a 44 year old year old male patient here today for evaluation and managment of basal cell carcinoma on right axilla.  Patient has no other skin complaints today.  Remainder of the HPI, Meds, PMH, Allergies, FH, and SH was reviewed in chart.      Past Medical History:   Diagnosis Date     Basal cell carcinoma      Polydactyly      Webbed fingers of both hands        Past Surgical History:   Procedure Laterality Date     C MUSCLE-SKIN FLAP,ARM       HC RECONST POLYDACT DIGIT,SOFT TIS & BONE      removal 6th digit from each hand and foot        Family History   Problem Relation Age of Onset     Liver Disease Father         hemochromatosis     Skin Cancer Mother        Social History     Socioeconomic History     Marital status:      Spouse name: Not on file     Number of children: Not on file     Years of education: Not on file     Highest education level: Not on file   Occupational History     Occupation:      Employer: [employer]   Social Needs     Financial resource strain: Not on file     Food insecurity:     Worry: Not on file     Inability: Not on file     Transportation needs:     Medical: Not on file     Non-medical: Not on file   Tobacco Use     Smoking status: Never Smoker     Smokeless tobacco: Never Used   Substance and Sexual Activity     Alcohol use: Yes     Comment: \"every now and then\"     Drug use: No     Sexual activity: Yes     Partners: Female   Lifestyle     Physical activity:     Days per week: Not on file     Minutes per session: Not on file     Stress: Not on file   Relationships     Social " connections:     Talks on phone: Not on file     Gets together: Not on file     Attends Orthodoxy service: Not on file     Active member of club or organization: Not on file     Attends meetings of clubs or organizations: Not on file     Relationship status: Not on file     Intimate partner violence:     Fear of current or ex partner: Not on file     Emotionally abused: Not on file     Physically abused: Not on file     Forced sexual activity: Not on file   Other Topics Concern     Parent/sibling w/ CABG, MI or angioplasty before 65F 55M? Not Asked   Social History Narrative     Not on file       Outpatient Encounter Medications as of 1/30/2020   Medication Sig Dispense Refill     methocarbamol (ROBAXIN) 750 MG tablet Take 1 tablet (750 mg) by mouth 4 times daily as needed (Patient not taking: Reported on 6/7/2019) 40 tablet 0     No facility-administered encounter medications on file as of 1/30/2020.              Review Of Systems  Skin: As above  Eyes: negative  Ears/Nose/Throat: negative  Respiratory: No shortness of breath, dyspnea on exertion, cough, or hemoptysis  Cardiovascular: negative  Gastrointestinal: negative  Genitourinary: negative  Musculoskeletal: negative  Neurologic: negative  Psychiatric: negative  Hematologic/Lymphatic/Immunologic: negative  Endocrine: negative      O:   NAD, WDWN, Alert & Oriented, Mood & Affect wnl, Vitals stable   Here today alone   BP (!) 142/84   Pulse 84   SpO2 97%    General appearance normal   Vitals stable   Alert, oriented and in no acute distress     R axilla 7.5x3.3 verrucous plaque      Eyes: Conjunctivae/lids:Normal     ENT: Lips, buccal mucosa, tongue: normal    MSK:Normal    Cardiovascular: peripheral edema none    Pulm: Breathing Normal    Lymph Nodes: No Head and Neck Lymphadenopathy     Neuro/Psych: Orientation:Alert and Orientedx3 ; Mood/Affect:normal       A/P:  1. R axilla basal cell carcinoma   MOHS:   Size    After PGACAC discussed with patient,  decision for Mohs surgery was made. Indication for Mohs was Size. Patient confirmed the site with Dr. Wang.  After anesthesia with LEC, the tumor was excised using standard Mohs technique in 1 stages(s).  CLEAR MARGINS OBTAINED and Final defect size was 8.3 x 4 cm.     First stage 8 tissue blocks       REPAIR COMPLEX: Because of the tightness of the surrounding skin and Because of the size and full thickness nature of the defect, a complex closure was planned. After LEC anesthesia and prep, Burow's triangles were excised in the relaxed skin tension lines. The wound edges were widely undermined by dissection in the subcutaneous plane until adequate tissue mobility was obtained. Hemostasis was obtained. The wound edges were closed in a layered fashion using Vicryl and Fast Absorbing Plain Gut sutures. Postoperative length was 8.6 cm.   EBL minimal; complications none; wound care routine.  The patient was discharged in good condition and will return in one week for wound evaluation.  BENIGN LESIONS DISCUSSED WITH PATIENT:  I discussed the specifics of tumor, prognosis, and genetics of benign lesions.  I explained that treatment of these lesions would be purely cosmetic and not medically neccessary.  I discussed with patient different removal options including excision, cautery and /or laser.      Nature and genetics of benign skin lesions dicussed with patient.  Signs and Symptoms of skin cancer discussed with patient.  ABCDEs of melanoma reviewed with patient.  Patient encouraged to perform monthly skin exams.  UV precautions reviewed with patient.  Patient to follow up with Primary Care provider regarding elevated blood pressure.  Skin care regimen reviewed with patient: Eliminate harsh soaps, i.e. Dial, zest, irsih spring; Mild soaps such as Cetaphil or Dove sensitive skin, avoid hot or cold showers, aggressive use of emollients including vanicream, cetaphil or cerave discussed with patient.    Risks of  non-melanoma skin cancer discussed with patient   Return to clinic 6 months      Again, thank you for allowing me to participate in the care of your patient.        Sincerely,        Ming Wang MD

## 2020-01-30 NOTE — NURSING NOTE
"Initial BP (!) 142/84   Pulse 84   SpO2 97%  Estimated body mass index is 32.77 kg/m  as calculated from the following:    Height as of 4/3/19: 1.81 m (5' 11.25\").    Weight as of 5/1/19: 107.3 kg (236 lb 9.6 oz).     SONIA Corea-BSN-N  [facility] [place]  [phone]  "

## 2020-02-07 ENCOUNTER — ALLIED HEALTH/NURSE VISIT (OUTPATIENT)
Dept: DERMATOLOGY | Facility: CLINIC | Age: 45
End: 2020-02-07
Payer: COMMERCIAL

## 2020-02-07 DIAGNOSIS — Z48.01 ENCOUNTER FOR CHANGE OR REMOVAL OF SURGICAL WOUND DRESSING: Primary | ICD-10-CM

## 2020-02-07 PROCEDURE — 99207 ZZC NO CHARGE NURSE ONLY: CPT

## 2020-02-07 NOTE — NURSING NOTE
Pt returned to clinic for post surgery 1 week follow up bandage change. Pt has no complaints, denies pain. Bandage removed from right axillia, area cleansed with normal saline. Site is healing and wound edges approximating well. Reapplied new steri strips and paper tape.    Advised to watch for signs/sx of infection; spreading redness, drainage, odor, fever. Call or report promptly to clinic. Pt given written instructions and informed to rtc as needed. Patient verbalized understanding.

## 2020-02-07 NOTE — PATIENT INSTRUCTIONS

## 2020-02-24 ENCOUNTER — TELEPHONE (OUTPATIENT)
Dept: DERMATOLOGY | Facility: CLINIC | Age: 45
End: 2020-02-24

## 2020-02-24 NOTE — TELEPHONE ENCOUNTER
Left message on answering machine for patient to call back.    Thank you,    Joselyn BRICEÑORN BSN  [place] [facility]  [phone]  [place] [facility]  [phone]

## 2020-02-24 NOTE — TELEPHONE ENCOUNTER
Patient would like to speak to a nurse about his wound and the way it is healing. Patient called back to talk to dermatolgy office please call patient back at [phone]

## 2020-02-25 NOTE — TELEPHONE ENCOUNTER
Called and spoke to patient.    Patient had mohs on 1/30/20- BCC, right axilla.    Patient has a small hole where a suture broke open- wanted to know if he should come back in to have the area re-sutured. Informed patient provider would not go back in w/ sutures- because he would be sewing bacteria into the wound.    Advised patient to liberally apply ointment/bandage daily until the area is fully healed. Patient has been applying neosporin and a band-aid. Advised he switch to Vaseline, bacitracin, polysporin, or Aquaphor.    Patient reports intermittent pain (only to the touch), no foul smell or drainage.    Advised to watch for signs/sx of infection; spreading redness, drainage, odor, fever. Call or report promptly to clinic.    Patient voiced understanding.    SONIA Corea-BSN-N  [facility]  [phone]

## 2020-06-05 ENCOUNTER — OFFICE VISIT (OUTPATIENT)
Dept: DERMATOLOGY | Facility: CLINIC | Age: 45
End: 2020-06-05
Payer: COMMERCIAL

## 2020-06-05 VITALS — OXYGEN SATURATION: 97 % | SYSTOLIC BLOOD PRESSURE: 120 MMHG | DIASTOLIC BLOOD PRESSURE: 76 MMHG | HEART RATE: 59 BPM

## 2020-06-05 DIAGNOSIS — D18.01 ANGIOMA OF SKIN: ICD-10-CM

## 2020-06-05 DIAGNOSIS — L57.0 ACTINIC KERATOSIS: ICD-10-CM

## 2020-06-05 DIAGNOSIS — L81.4 LENTIGO: ICD-10-CM

## 2020-06-05 DIAGNOSIS — Z85.828 HISTORY OF BASAL CELL CARCINOMA (BCC) OF SKIN: ICD-10-CM

## 2020-06-05 DIAGNOSIS — D22.9 NEVUS: Primary | ICD-10-CM

## 2020-06-05 DIAGNOSIS — L82.1 SEBORRHEIC KERATOSIS: ICD-10-CM

## 2020-06-05 PROCEDURE — 17000 DESTRUCT PREMALG LESION: CPT | Performed by: PHYSICIAN ASSISTANT

## 2020-06-05 PROCEDURE — 99214 OFFICE O/P EST MOD 30 MIN: CPT | Mod: 25 | Performed by: PHYSICIAN ASSISTANT
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
## 2020-06-05 NOTE — PATIENT INSTRUCTIONS
WOUND CARE INSTRUCTIONS   FOR CRYOSURGERY   R forearm    This area treated with liquid nitrogen should form a blister (areas treated may or may not blister-skin may just turn dark and slough off). You do not need to bandage the area unless a blister forms and breaks (which may be a few days). When the blister breaks, begin daily dressing changes as follows:  1) Clean and dry the area with tap water using clean Q-tip or sterile gauze pad.   2) Apply Polysporin ointment or Bacitracin ointment over entire wound. Do NOT use Neosporin ointment.   3) Cover the wound with a band-aid or sterile non-stick gauze pad and micropore paper tape.   REPEAT THESE INSTRUCTIONS AT LEAST ONCE A DAY UNTIL THE WOUND HAS COMPLETELY HEALED.   It is an old wives tale that a wound heals better when it is exposed to air and allowed to dry out. The wound will heal faster with a better cosmetic result if it is kept moist with ointment and covered with a bandage.   Do not let the wound dry out.   IMPORTANT INFORMATION ON REVERSE SIDE   Supplies Needed:   *Cotton tipped applicators (Q-tips)   *Polysporin ointment or Bacitracin ointment (NOT NEOSPORIN)   *Band-aids, or non stick gauze pads and micropore paper tape   PATIENT INFORMATION   During the healing process you will notice a number of changes. All wounds develop a small halo of redness surrounding the wound. This means healing is occurring. Severe itching with extensive redness usually indicates sensitivity to the ointment or bandage tape used to dress the wound. You should call our office if this develops.   Swelling and/or discoloration around your surgical site is common, particularly when performed around the eye.   All wounds normally drain. The larger the wound the more drainage there will be. After 7-10 days, you will notice the wound beginning to shrink and new skin will begin to grow. The wound is healed when you can see skin has formed over the entire area. A healed wound has a  healthy, shiny look to the surface and is red to dark pink in color to normalize. Wounds may take approximately 4-6 weeks to heal. Larger wounds may take 6-8 weeks. After the wound is healed you may discontinue dressing changes.   You may experience a sensation of tightness as your wound heals. This is normal and will gradually subside.   Your healed wound may be sensitive to temperature changes. This sensitivity improves with time, but if you re having a lot of discomfort, try to avoid temperature extremes.   Patients frequently experience itching after their wound appears to have healed because of the continue healing under the skin. Plain Vaseline will help relieve the itching.
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
## 2020-09-23 ENCOUNTER — ALLIED HEALTH/NURSE VISIT (OUTPATIENT)
Dept: NURSING | Facility: CLINIC | Age: 45
End: 2020-09-23
Payer: COMMERCIAL

## 2020-09-23 DIAGNOSIS — Z23 NEEDS FLU SHOT: Primary | ICD-10-CM

## 2020-09-23 PROCEDURE — 90686 IIV4 VACC NO PRSV 0.5 ML IM: CPT

## 2020-09-23 PROCEDURE — 90471 IMMUNIZATION ADMIN: CPT

## 2020-09-23 NOTE — PROGRESS NOTES
Patient presents to influenza program requesting influenza vaccination.  Standing orders implemented.    Vaccination given by Dionne Driscoll, [facility]

## 2024-10-04 NOTE — TELEPHONE ENCOUNTER
----- Message from Lori Morris PA-C sent at 1/2/2020 11:58 AM CST -----  Shave, right axilla:   - Basal cell carcinoma, superficial and nodular types, extending to the   lateral and deep margins     Large size 7.0cm    --mohs      
Called and LM for patient to call back in regards to biopsy results.    Anmol RN-BSN-PHN  [facility]  [phone]  
Called and LM for patient to call back in regards to biopsy results.    Anmol RN-BSN-PHN  [facility]  [phone]    
Patient called back.    Educated patient on biopsy results- BCC.     Educated patient on BCC, mohs, scheduled mohs, and letter/packet sent.    Patient voiced understanding.    Anmol RN-BSN-N  [facility]  [phone]  
Patient is returning the call from derm dept, he says you can call him back at anytime & its okay to leave a v/m message too  
General Sunscreen Counseling: I recommended a broad spectrum sunscreen with a SPF of 30 or higher.  I explained that SPF 30 sunscreens block approximately 97 percent of the sun's harmful rays.  Sunscreens should be applied at least 15 minutes prior to expected sun exposure and then every 2 hours after that as long as sun exposure continues. If swimming or exercising sunscreen should be reapplied every 45 minutes to an hour after getting wet or sweating.  One ounce, or the equivalent of a shot glass full of sunscreen, is adequate to protect the skin not covered by a bathing suit. I also recommended a lip balm with a sunscreen as well. Sun protective clothing can be used in lieu of sunscreen but must be worn the entire time you are exposed to the sun's rays.
Detail Level: Detailed

## 2024-12-12 NOTE — LETTER
"    12/30/2019         RE: Raymond Amaya  [address] [facility] [id number]        Dear Colleague,    Thank you for referring your patient, Raymond Amaya, to the [facility]. Please see a copy of my visit note below.    HPI:  Raymond Amaya is a 44 year old male patient here today for growth on right underarm .  Patient states this has been present for 1.5 years.  Patient reports the following symptoms: cracks, bleeds, painful. .  Patient reports the following previous treatments: otc deodorant helps with pain.  Patient reports the following modifying factors: none.  Associated symptoms: none.  Patient has no other skin complaints today.  Remainder of the HPI, Meds, PMH, Allergies, FH, and SH was reviewed in chart.    Pertinent hx: mother had a skin cancer. Unknown type.   Past Medical History:   Diagnosis Date     Polydactyly      Webbed fingers of both hands        Past Surgical History:   Procedure Laterality Date     C MUSCLE-SKIN FLAP,ARM       HC RECONST POLYDACT DIGIT,SOFT TIS & BONE      removal 6th digit from each hand and foot        Family History   Problem Relation Age of Onset     Liver Disease Father         hemochromatosis     Skin Cancer Mother        Social History     Socioeconomic History     Marital status:      Spouse name: Not on file     Number of children: Not on file     Years of education: Not on file     Highest education level: Not on file   Occupational History     Occupation:      Employer: [employer]   Social Needs     Financial resource strain: Not on file     Food insecurity:     Worry: Not on file     Inability: Not on file     Transportation needs:     Medical: Not on file     Non-medical: Not on file   Tobacco Use     Smoking status: Never Smoker     Smokeless tobacco: Never Used   Substance and Sexual Activity     Alcohol use: Yes     Comment: \"every now and then\"     Drug use: No     Sexual activity: Yes     " Detail Level: Zone Partners: Female   Lifestyle     Physical activity:     Days per week: Not on file     Minutes per session: Not on file     Stress: Not on file   Relationships     Social connections:     Talks on phone: Not on file     Gets together: Not on file     Attends Baptism service: Not on file     Active member of club or organization: Not on file     Attends meetings of clubs or organizations: Not on file     Relationship status: Not on file     Intimate partner violence:     Fear of current or ex partner: Not on file     Emotionally abused: Not on file     Physically abused: Not on file     Forced sexual activity: Not on file   Other Topics Concern     Parent/sibling w/ CABG, MI or angioplasty before 65F 55M? Not Asked   Social History Narrative     Not on file       Outpatient Encounter Medications as of 12/30/2019   Medication Sig Dispense Refill     methocarbamol (ROBAXIN) 750 MG tablet Take 1 tablet (750 mg) by mouth 4 times daily as needed (Patient not taking: Reported on 6/7/2019) 40 tablet 0     No facility-administered encounter medications on file as of 12/30/2019.        Review Of Systems:  Skin: rash on right underarm  Eyes: negative  Ears/Nose/Throat: negative  Respiratory: No shortness of breath, dyspnea on exertion, cough, or hemoptysis  Cardiovascular: negative  Gastrointestinal: negative  Genitourinary: negative  Musculoskeletal: negative  Neurologic: negative  Psychiatric: negative  Hematologic/Lymphatic/Immunologic: negative  Endocrine: negative      Objective:     BP (!) 143/82   Pulse 73   SpO2 96%   Eyes: Conjunctivae/lids: Normal   ENT: Lips:  Normal  MSK: Normal  Cardiovascular: Peripheral edema none  Pulm: Breathing Normal  Neuro/Psych: Orientation: A/O x 3 Normal; Mood/Affect: Normal, NAD, WDWN  Pt accompanied by: self  Following areas examined: face, neck, right and left axilla, right arm, forearm, and hands  Willard skin type:i   Findings:  Pink fissured plaque with raised lobular  borders  Red smooth well-defined macules on right flank  Buchanan WD smooth macules on face and RU    Assessment and Plan:  1) Rash and nonspecific skin eruption 7.0cm  Biopsy on right axilla 0.5cm b  Acanthoma vs isk vs surya surya  TANGENTIAL BIOPSY:  After consent, anesthesia with LEC and prep, tangential biopsy performed.  No complications and routine wound care.  May grow back and will get a scar. Based on lesion type may need to completely remove lesion. Patient will be notified in 7-10 days of results. Wound care directions given.  Disc barrier cream such as vaseline, aquaphor or desitin  Disc rx topicals. Pt defers, would like to wait until biopsy results are in.    2) Lentigines and cherry angiomas    Treatment of these lesions would be purely cosmetic and not medically neccessary.  Lesion may recur and/or may not completely resolve. May need additional treatment.  Different removal options including excision, cryotherapy, cautery and /or laser.      3) family history of skin cancer  Recommend yearly FBE.  Wear a sunscreen with at least SPF 30 on your face, ears, neck and V of the chest daily. Wear sunscreen on other areas of the body if those areas are exposed to the sun throughout the day. Sunscreens can contain physical and/or chemical blockers. Physical blockers are less likely to clog pores, these include zinc oxide and titanium dioxide. Reapply every two hour and after swimming. Sunscreen examples include Neutrogena, CeraVe, Blue Lizard, Elta MD and many others.        Raymond to follow up with Primary Care provider regarding elevated blood pressure.      Follow up in based on biopsy results.       Again, thank you for allowing me to participate in the care of your patient.        Sincerely,        Lori Morris PA-C     Detail Level: Detailed